# Patient Record
Sex: MALE | Race: WHITE | HISPANIC OR LATINO | Employment: FULL TIME | URBAN - METROPOLITAN AREA
[De-identification: names, ages, dates, MRNs, and addresses within clinical notes are randomized per-mention and may not be internally consistent; named-entity substitution may affect disease eponyms.]

---
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
## 2025-01-09 ENCOUNTER — OFFICE VISIT (OUTPATIENT)
Dept: PRIMARY CARE | Facility: CLINIC | Age: 20
End: 2025-01-09
Payer: COMMERCIAL

## 2025-01-09 VITALS
BODY MASS INDEX: 23.7 KG/M2 | HEIGHT: 67 IN | HEART RATE: 122 BPM | DIASTOLIC BLOOD PRESSURE: 72 MMHG | RESPIRATION RATE: 16 BRPM | OXYGEN SATURATION: 98 % | WEIGHT: 151 LBS | SYSTOLIC BLOOD PRESSURE: 146 MMHG | TEMPERATURE: 98.7 F

## 2025-01-09 DIAGNOSIS — R05.1 ACUTE COUGH: Primary | ICD-10-CM

## 2025-01-09 DIAGNOSIS — F41.9 ANXIETY: ICD-10-CM

## 2025-01-09 DIAGNOSIS — F32.A DEPRESSION, UNSPECIFIED DEPRESSION TYPE: ICD-10-CM

## 2025-01-09 PROBLEM — K35.80 ACUTE APPENDICITIS: Status: RESOLVED | Noted: 2024-05-08 | Resolved: 2025-01-09

## 2025-01-09 LAB
POC RAPID INFLUENZA A: NEGATIVE
POC RAPID INFLUENZA B: NEGATIVE
POC SARS-COV-2 AG BINAX: NORMAL

## 2025-01-09 PROCEDURE — 99214 OFFICE O/P EST MOD 30 MIN: CPT

## 2025-01-09 PROCEDURE — 3008F BODY MASS INDEX DOCD: CPT

## 2025-01-09 PROCEDURE — 1036F TOBACCO NON-USER: CPT

## 2025-01-09 PROCEDURE — 87804 INFLUENZA ASSAY W/OPTIC: CPT

## 2025-01-09 PROCEDURE — 87811 SARS-COV-2 COVID19 W/OPTIC: CPT

## 2025-01-09 RX ORDER — BENZONATATE 100 MG/1
100 CAPSULE ORAL 3 TIMES DAILY PRN
Qty: 42 CAPSULE | Refills: 0 | Status: SHIPPED | OUTPATIENT
Start: 2025-01-09 | End: 2025-02-08

## 2025-01-09 RX ORDER — ESCITALOPRAM OXALATE 10 MG/1
10 TABLET ORAL DAILY
Qty: 30 TABLET | Refills: 1 | Status: SHIPPED | OUTPATIENT
Start: 2025-01-09

## 2025-01-09 ASSESSMENT — ENCOUNTER SYMPTOMS: COUGH: 1

## 2025-01-09 NOTE — PATIENT INSTRUCTIONS
Take honey with warm fluids to help with cough.   Take the mucinex with DM for cough and congestion.   Take the tessalon for cough  Drink plenty of fluids, at least 64 ounce for the next several days.  Follow up in 2 weeks if not feeling better from sickness. Follow up in 4 weeks for anxiety.

## 2025-01-09 NOTE — PROGRESS NOTES
Subjective   Kev iGbbs is a 19 y.o. male who presents for Nausea (Fever, coughing up blood x2 weeks), Cough, and Results (Please go over blood work from last year).    Patient previously had a fever of 101 for 1 day with cough and congestion several days ago with slight improvement in his cough and congestion though he feels he has plateaued.  He does have a lot of phlegm that he coughs up.  He has been having a lot of coughing recently wheeze noted small amounts of flecks of red and there which she believes is blood.  Not having a significant amount of blood in his sputum, not coughing up a significant amount of blood.  Not having any bloody noses.  Does not have any fevers currently.  No shortness of breath.  No chest pain, no leg swelling, no leg pain, no dizziness or lightheadedness.  Patient endorses that he feels dehydrated because he is not been drinking a lot of fluids.  He has been at work and exerting himself drinking occasionally soda but not enough fluids let alone water.    He does have a history of anxiety, feels like his heart rate is elevated at most doctor appointments, checking his smart watch his heart rate does dip down to about 80-90 at home.  Would like to restart his SSRI.    Cough        Review of Systems   Respiratory:  Positive for cough.        Objective   Visit Vitals  /72   Pulse (!) 122   Temp 37.1 °C (98.7 °F) (Temporal)   Resp 16      Physical Exam  Constitutional:       General: He is not in acute distress.     Appearance: He is not ill-appearing or diaphoretic.   HENT:      Nose: Congestion and rhinorrhea present.      Mouth/Throat:      Mouth: Mucous membranes are dry.      Pharynx: Postnasal drip present. No pharyngeal swelling, oropharyngeal exudate, posterior oropharyngeal erythema or uvula swelling.   Cardiovascular:      Rate and Rhythm: Regular rhythm. Tachycardia present.      Pulses: Normal pulses.      Heart sounds: No murmur heard.  Pulmonary:      Effort:  Pulmonary effort is normal. No respiratory distress.      Breath sounds: No wheezing, rhonchi or rales.   Skin:     General: Skin is warm and dry.      Capillary Refill: Capillary refill takes less than 2 seconds.      Coloration: Skin is not jaundiced or pale.      Findings: No erythema or rash.   Neurological:      Mental Status: He is alert.   Psychiatric:         Attention and Perception: Attention and perception normal.         Mood and Affect: Affect normal. Mood is anxious.         Assessment & Plan  Acute cough  Patient presenting for acute cough suggestive of likely upper respiratory infection with postnasal drip.  No signs and exam of pneumonia with no focal lung sounds.  Moving air well bilaterally.  He does look like he has dry mucous membranes and he endorses been having decreased oral intake of fluids.  Suspect that his elevated heart rate is likely due to dehydration we discussed adequate fluid intake.  COVID and flu negative  Will treat symptomatically with strict return precautions for worsening cough, shortness of breath, elevated heart rate patient is agreeable  Orders:    POCT BinaxNOW Covid-19 Ag Card manually resulted    POCT Influenza A/B manually resulted    dextromethorphan-guaifenesin (Mucinex DM)  mg 12 hr tablet; Take 1 tablet by mouth every 12 hours for 10 days. Do not crush, chew, or split.    benzonatate (Tessalon) 100 mg capsule; Take 1 capsule (100 mg) by mouth 3 times a day as needed for cough. Do not crush or chew.    Depression, unspecified depression type  Patient previously on Lexapro 10 mg for depression and stopped after previous surgery, would like to restart  Per orders  Follow-up in 4 weeks  Orders:    escitalopram (Lexapro) 10 mg tablet; Take 1 tablet (10 mg) by mouth once daily.    Anxiety  Patient also endorses having anxiety in addition to depression.  He has significant anxiety when go to the doctor's office and normally has elevated heart rates.  He is already  on Lexapro 10 mg previously for depression, we will restart this and see if this improves his anxiety as well.  Follow-up in 4 weeks.                Jori Bolanos DO   01/09/25 2:54 PM

## 2025-01-09 NOTE — ASSESSMENT & PLAN NOTE
Patient previously on Lexapro 10 mg for depression and stopped after previous surgery, would like to restart  Per orders  Follow-up in 4 weeks  Orders:    escitalopram (Lexapro) 10 mg tablet; Take 1 tablet (10 mg) by mouth once daily.

## 2025-01-09 NOTE — ASSESSMENT & PLAN NOTE
Patient also endorses having anxiety in addition to depression.  He has significant anxiety when go to the doctor's office and normally has elevated heart rates.  He is already on Lexapro 10 mg previously for depression, we will restart this and see if this improves his anxiety as well.  Follow-up in 4 weeks.

## (undated) DEVICE — RETRIEVAL SYSTEM, MONARCH, 10MM DISP ENDOSCOPIC

## (undated) DEVICE — STAPLER,  ENDO ECHELON 45MM RELOAD, BLUE

## (undated) DEVICE — NEEDLE, HYPODERMIC, REGULAR WALL, REGULAR BEVEL, 18 G X 1 IN

## (undated) DEVICE — DRAPE, SURGICAL, LAP CHOLY, 76 X 120

## (undated) DEVICE — TISSUE ADHESIVE, PREMIERPRO EXOFIN, PRECISION PEN HV, 1.0ML

## (undated) DEVICE — NEEDLE, INSUFFLATION, 13GAX120MM, DISP

## (undated) DEVICE — FILTER, LAPAROSCOPIC, PLUME PORT, W/LUER CONNECTOR, STERILE

## (undated) DEVICE — STRAP, KNEE AND BODY, SINGLE USE

## (undated) DEVICE — SUTURE, VICRYL, 0, 27 IN, UR-6, VIOLET

## (undated) DEVICE — DEVICE, OMNICLOSE, 10MM

## (undated) DEVICE — Device

## (undated) DEVICE — LIGASURE, V SEALER/DIVIDER  5MM BLUNT TIP

## (undated) DEVICE — TROCAR, OPTICAL, BLADELESS, 12MM, THREADED, 100MM LENGTH

## (undated) DEVICE — NEEDLE, HYPO, 22G X 1 1/2IN, ECLIP, LF

## (undated) DEVICE — SYRINGE, 20 CC, LUER LOCK

## (undated) DEVICE — STAPLER, ECHELON 3000, 45MM STD

## (undated) DEVICE — GLOVE, PROTEXIS PI CLASSIC, SZ-7.5, PF, LF

## (undated) DEVICE — SOLUTION, INJECTION, USP, SODIUM CHLORIDE 0.9%, .9, NACL, 1000 ML, BAG

## (undated) DEVICE — TUBING SET, INSUFFLATION

## (undated) DEVICE — TROCAR, KII OPTICAL BLADELESS 5MM Z THREAD 100MM LNGTH

## (undated) DEVICE — APPLICATOR, CHLORAPREP, W/ORANGE TINT, 26ML

## (undated) DEVICE — NEEDLE, INSUFFLATION, PNEUMOPERITONEUM, SURGINEEDLE, 120 MM